# Patient Record
Sex: FEMALE | Race: ASIAN | NOT HISPANIC OR LATINO | Employment: FULL TIME | URBAN - METROPOLITAN AREA
[De-identification: names, ages, dates, MRNs, and addresses within clinical notes are randomized per-mention and may not be internally consistent; named-entity substitution may affect disease eponyms.]

---

## 2021-05-14 ENCOUNTER — OFFICE VISIT (OUTPATIENT)
Dept: OBGYN CLINIC | Facility: CLINIC | Age: 41
End: 2021-05-14
Payer: COMMERCIAL

## 2021-05-14 VITALS
DIASTOLIC BLOOD PRESSURE: 64 MMHG | BODY MASS INDEX: 21.31 KG/M2 | HEIGHT: 64 IN | WEIGHT: 124.8 LBS | SYSTOLIC BLOOD PRESSURE: 90 MMHG

## 2021-05-14 DIAGNOSIS — Z01.419 ENCOUNTER FOR ANNUAL ROUTINE GYNECOLOGICAL EXAMINATION: Primary | ICD-10-CM

## 2021-05-14 DIAGNOSIS — F41.9 ANXIETY: ICD-10-CM

## 2021-05-14 DIAGNOSIS — Z12.31 ENCOUNTER FOR SCREENING MAMMOGRAM FOR MALIGNANT NEOPLASM OF BREAST: ICD-10-CM

## 2021-05-14 DIAGNOSIS — Z12.4 CERVICAL CANCER SCREENING: ICD-10-CM

## 2021-05-14 DIAGNOSIS — Z30.011 ENCOUNTER FOR BCP (BIRTH CONTROL PILLS) INITIAL PRESCRIPTION: ICD-10-CM

## 2021-05-14 PROCEDURE — G0145 SCR C/V CYTO,THINLAYER,RESCR: HCPCS | Performed by: OBSTETRICS & GYNECOLOGY

## 2021-05-14 PROCEDURE — 99386 PREV VISIT NEW AGE 40-64: CPT | Performed by: OBSTETRICS & GYNECOLOGY

## 2021-05-14 PROCEDURE — 87624 HPV HI-RISK TYP POOLED RSLT: CPT | Performed by: OBSTETRICS & GYNECOLOGY

## 2021-05-14 RX ORDER — CLONIDINE HYDROCHLORIDE 0.1 MG/1
0.1 TABLET ORAL
COMMUNITY
Start: 2021-05-07

## 2021-05-14 RX ORDER — NORETHINDRONE ACETATE AND ETHINYL ESTRADIOL 1MG-20(21)
1 KIT ORAL DAILY
Qty: 28 TABLET | Refills: 3 | Status: SHIPPED | OUTPATIENT
Start: 2021-05-14

## 2021-05-14 NOTE — PROGRESS NOTES
Annual Wellness Visit  [address]   [address] [facility], [place]    ASSESSMENT/PLAN: Barrett Royal is a 41 y o   who presents for annual gynecologic exam     Encounter for routine gynecologic examination  - Routine well woman exam completed today  - Cervical Cancer Screening: Current ASCCP Guidelines reviewed  Last Pap: 2018 per pt Next Pap Due: today, routine   - STI screening offered including HIV testing: offered, pt declined  - Contraceptive counseling discussed  Current contraception: condoms  - Breast Cancer Screening: Last Mammogram Not on file, not yet done  - The following were reviewed in today's visit: mammography screening ordered, use and side effects of OCPs, exercise and healthy diet    Additional problems addressed during this visit:  1  Encounter for annual routine gynecological examination    2  Encounter for screening mammogram for malignant neoplasm of breast  -     Mammo screening bilateral w 3d & cad; Future; Expected date: 2022    3  Cervical cancer screening  -     Liquid-based pap, screening    4  Encounter for BCP (birth control pills) initial prescription  Comments:  reviewed BCP could help with anxiety, although for some people they cause anxiety  We can only determine her response through trial and error  Risks, benefits and limitations of hormonal contraction discussed - including increase risk of DVT/PE w/ all estrogen containing contraceptives  No Contraindication to use identified  Reviewed that only condoms protect against STIs  Will give pt 3 months of contraceptive  To start with/after next period (reviewed w/ pt timing)  Return in 3 months to review  Orders:  -     norethindrone-ethinyl estradiol (JUNEL FE 1/20) 1-20 MG-MCG per tablet; Take 1 tablet by mouth daily    5  Anxiety  Assessment & Plan:  Seeing psychiatrist and therapist   Started clonopin 2021      Pt feels it is worse since periods became regular and wonders if hormonal   Discussed that her hormones for ovulation are likely normal because she is having regular periods although she may be sensitive to the changes in these hormones with ovulation and they worsen her anxiety  She states always felt good on OCPs and willing to see if OCPs will help stabilize hormone levels and help anxiety  Next visit: 3 month pill check; 1 year Wellness      CC:  Annual Gynecologic Examination    HPI: Jam Parry is a 41 y o   who presents for annual gynecologic examination  New patient presents for gyn annual   She denies issues with periods, breast or urinary issues  Ayeshaallyssa Narinder does state she has issues with anxiety and she feels this is worsened by hormones  She report h/o PCOS in past, very irregular periods and needed to used clomid to concieve  States when periods were irregular she never had anxiety issues  After the delivery of her daughter 3 years ago she had severe anxiety - so badly required them to relocate to [place] from [place]  She states she also started having regular periods after her daughter was born  Her anxiety had been improving after the move, but she states it worsened suddenly when she got Covid 19 in January  Physically she is recovered from Covid but anxiety is still elevated  She states she follows with a psychiatrist in [place] and a therapist here in [place]  She was recently started on clonopin for the anxiety a couple weeks ago  She states when she was on OCPs in her 25s she always felt well - called it "happy pills"  Last pap 2018, normal paps except early 20s, returned, + HPV, HSV  No mammograms previously  Gyn History  Patient's last menstrual period was 2021 (within days)  Menstrual History  Period Pattern: Regular  Last Pap: Not on file was normal    She  reports being sexually active and has had partner(s) who are Male   She reports using the following method of birth control/protection: Condom Male   Sexual History  Sexually Transmitted Infection History: HSV  Is Patient in a Monogamous Relationship?: Yes    OB History      Past Medical History:  2018: Anxiety      Comment:  started after delivery 3rd daughter; seeing psychiatrist               and therapist  No date: Insomnia  No date: Pre-diabetes      Comment:  PCP checks HgA1c every 6 months     Past Surgical History:  : EYE SURGERY; Right      Comment:  removal of benign tumor  : WISDOM TOOTH EXTRACTION     Family History   Problem Relation Age of Onset    Hashimoto's thyroiditis Sister     Breast cancer Neg Hx     Uterine cancer Neg Hx     Ovarian cancer Neg Hx     Colon cancer Neg Hx         Social History     Tobacco Use    Smoking status: Never Smoker    Smokeless tobacco: Never Used   Substance Use Topics    Alcohol use: Not Currently    Drug use: Never          Current Outpatient Medications:     cloNIDine (CATAPRES) 0 1 mg tablet, Take 0 1 mg by mouth daily at bedtime, Disp: , Rfl:     norethindrone-ethinyl estradiol (JUNEL FE 1/20) 1-20 MG-MCG per tablet, Take 1 tablet by mouth daily, Disp: 28 tablet, Rfl: 3    She is allergic to amoxicillin       ROS negative except as noted in HPI    Objective:  BP 90/64   Ht 5' 3 75" (1 619 m)   Wt 56 6 kg (124 lb 12 8 oz)   LMP 2021 (Within Days)   Breastfeeding No   BMI 21 59 kg/m²      Physical Exam  Constitutional:       Appearance: Normal appearance  Chest:      Breasts:         Right: No mass or tenderness  Left: No mass or tenderness  Abdominal:      Palpations: Abdomen is soft  Tenderness: There is no abdominal tenderness  Genitourinary:     General: Normal vulva  Vagina: No bleeding or lesions  Cervix: Normal       Uterus: Normal  Not tender  Adnexa:         Right: No mass or tenderness  Left: No mass or tenderness  Rectum: No external hemorrhoid  Musculoskeletal: Normal range of motion     Lymphadenopathy: Upper Body:      Right upper body: No axillary adenopathy  Left upper body: No axillary adenopathy  Neurological:      Mental Status: She is alert and oriented to person, place, and time     Psychiatric:         Mood and Affect: Mood normal          Behavior: Behavior normal

## 2021-05-14 NOTE — ASSESSMENT & PLAN NOTE
Seeing psychiatrist and therapist   Started clonopin 5/2021  Pt feels it is worse since periods became regular and wonders if hormonal   Discussed that her hormones for ovulation are likely normal because she is having regular periods although she may be sensitive to the changes in these hormones with ovulation and they worsen her anxiety  She states always felt good on OCPs and willing to see if OCPs will help stabilize hormone levels and help anxiety

## 2021-05-17 LAB
HPV HR 12 DNA CVX QL NAA+PROBE: NEGATIVE
HPV16 DNA CVX QL NAA+PROBE: NEGATIVE
HPV18 DNA CVX QL NAA+PROBE: NEGATIVE

## 2021-05-19 LAB
LAB AP GYN PRIMARY INTERPRETATION: NORMAL
LAB AP LMP: NORMAL
Lab: NORMAL

## 2021-07-27 ENCOUNTER — TELEPHONE (OUTPATIENT)
Dept: OBGYN CLINIC | Facility: CLINIC | Age: 41
End: 2021-07-27

## 2021-07-30 NOTE — TELEPHONE ENCOUNTER
Puja l/m she would like her mammo orders printed in [place] to   She would like c/b to confirm  Spoke with Jeff Correa in [place] who will print out for patient    L/m on patient v/m

## 2023-02-08 ENCOUNTER — TELEPHONE (OUTPATIENT)
Dept: OBGYN CLINIC | Facility: CLINIC | Age: 43
End: 2023-02-08

## 2023-02-08 NOTE — TELEPHONE ENCOUNTER
Pt left a message she has been having left breast tenderness for months and is requesting to have an updated order for her mammogram   Last WA 5/14/2021,   Attempted to contact patient to further address, voice mailbox full, unable to leave a message

## 2023-02-08 NOTE — TELEPHONE ENCOUNTER
Spoke with patient and she reports, tenderness around the left nipple over the past month, more notabl  after a yoga workout  States" feels like I have mastitis," but denies redness, streaking, palpable lump or irregularity of nipple  Breast check appointment recommended to provide appropriate breast imaging orders   Pt transferred to reception to schedule

## 2023-02-24 ENCOUNTER — ANNUAL EXAM (OUTPATIENT)
Dept: OBGYN CLINIC | Facility: CLINIC | Age: 43
End: 2023-02-24

## 2023-02-24 VITALS
SYSTOLIC BLOOD PRESSURE: 102 MMHG | DIASTOLIC BLOOD PRESSURE: 60 MMHG | BODY MASS INDEX: 22.84 KG/M2 | WEIGHT: 133.8 LBS | HEIGHT: 64 IN

## 2023-02-24 DIAGNOSIS — Z01.419 ENCOUNTER FOR ANNUAL ROUTINE GYNECOLOGICAL EXAMINATION: Primary | ICD-10-CM

## 2023-02-24 DIAGNOSIS — Z12.31 ENCOUNTER FOR SCREENING MAMMOGRAM FOR MALIGNANT NEOPLASM OF BREAST: ICD-10-CM

## 2023-02-24 DIAGNOSIS — N64.4 BREAST PAIN, LEFT: ICD-10-CM

## 2023-02-24 RX ORDER — QUETIAPINE FUMARATE 50 MG/1
TABLET, EXTENDED RELEASE ORAL
COMMUNITY
Start: 2023-02-03

## 2023-02-24 NOTE — LETTER
2023     Skip Meth  [address]     Patient: Jomar Gandhi   YOB: 1980   Date of Visit: 2023       Dear Dr Herrera Rank: Thank you for referring Jomar Gandhi to me for evaluation  Below are my notes for this consultation  If you have questions, please do not hesitate to call me  I look forward to following your patient along with you  Sincerely,        Sandeep Bobby MD        CC: No Recipients  Sandeep Bobby MD  2023  2:16 PM  Sign when Signing Visit  Annual Wellness Visit  [address]   [address] [facility], [address], [facility]    ASSESSMENT/PLAN: Jomar Gandhi is a 38 y o   who presents for annual gynecologic exam     Encounter for routine gynecologic examination  - Routine well woman exam completed today  - Cervical Cancer Screening: Current ASCCP Guidelines reviewed  Last Pap: 2021 normal  Next Pap Due: 2 years, routine   - STI screening offered including HIV testing: offered, pt declined  - Contraceptive counseling discussed  Current contraception: condoms,   - Breast Cancer Screening: Last Mammogram 2022 per patient normal  - The following were reviewed in today's visit: mammography screening ordered, menopause, exercise and healthy diet    Additional problems addressed during this visit:  1  Encounter for annual routine gynecological examination    2  Encounter for screening mammogram for malignant neoplasm of breast  Comments:  Pt reports due for screening mammogram in February - provided bilateral diagnostic order given left breast pain  Orders:  -     Mammo diagnostic bilateral w 3d & cad; Future    3  Breast pain, left  Comments:  Intermittent left breast pain over last few months  Dx mammo and u/s order provided  Orders:  -     Mammo diagnostic bilateral w 3d & cad; Future  -     US breast left limited (diagnostic);  Future        Next visit: 1 year Wellness      CC:  Annual Gynecologic Examination    HPI: Carol Belcher is a 38 y o   who presents for annual gynecologic examination  She denies any urinary or pelvic issues at today's visit  Periods monthly since last delivery  Left breast pain off and on last couple months  May be cyclical or after exercise but she is unsure  No lumps or skin changes  Just when she stops worrying because it went away it seems to return again  Gyn History  Patient's last menstrual period was 02/10/2023 (approximate)  Last Pap: 2021 was normal    She  reports being sexually active and has had partner(s) who are male  She reports using the following method of birth control/protection: Condom Male  OB History      Past Medical History:  2018: Anxiety      Comment:  started after delivery 3rd daughter; seeing psychiatrist               and therapist  No date: Insomnia  No date: Pre-diabetes      Comment:  PCP checks HgA1c every 6 months     Past Surgical History:  1983: EYE SURGERY; Right      Comment:  removal of benign tumor  : WISDOM TOOTH EXTRACTION     Family History   Problem Relation Age of Onset   • Hashimoto's thyroiditis Sister    • Breast cancer Neg Hx    • Uterine cancer Neg Hx    • Ovarian cancer Neg Hx    • Colon cancer Neg Hx         Social History     Tobacco Use   • Smoking status: Never   • Smokeless tobacco: Never   Vaping Use   • Vaping Use: Never used   Substance Use Topics   • Alcohol use: Not Currently   • Drug use: Never          Current Outpatient Medications:   •  QUEtiapine (SEROquel XR) 50 mg, TAKE TWO TABLETS BY MOUTH EVERY EVENING, Disp: , Rfl:     She is allergic to amoxicillin       ROS negative except as noted in HPI    Objective:  /60   Ht 5' 3 5" (1 613 m)   Wt 60 7 kg (133 lb 12 8 oz)   LMP 02/10/2023 (Approximate)   Breastfeeding No   BMI 23 33 kg/m²     Physical Exam  Constitutional:       Appearance: Normal appearance  Chest:   Breasts:     Right: Normal  No mass or tenderness  Left: Normal  No mass or tenderness  Abdominal:      Palpations: Abdomen is soft  Tenderness: There is no abdominal tenderness  Genitourinary:     General: Normal vulva  Vagina: No bleeding or lesions  Cervix: Normal       Uterus: Normal  Not tender  Adnexa:         Right: No mass or tenderness  Left: No mass or tenderness  Rectum: No external hemorrhoid  Musculoskeletal:         General: Normal range of motion  Lymphadenopathy:      Upper Body:      Right upper body: No axillary adenopathy  Left upper body: No axillary adenopathy  Neurological:      Mental Status: She is alert and oriented to person, place, and time     Psychiatric:         Mood and Affect: Mood normal          Behavior: Behavior normal

## 2023-02-24 NOTE — PROGRESS NOTES
Annual Wellness Visit  [address]   [address], [address], [facility]    ASSESSMENT/PLAN: Bharat Anguiano is a 38 y o   who presents for annual gynecologic exam     Encounter for routine gynecologic examination  - Routine well woman exam completed today  - Cervical Cancer Screening: Current ASCCP Guidelines reviewed  Last Pap: 2021 normal  Next Pap Due: 2 years, routine   - STI screening offered including HIV testing: offered, pt declined  - Contraceptive counseling discussed  Current contraception: condoms,   - Breast Cancer Screening: Last Mammogram 2022 per patient normal  - The following were reviewed in today's visit: mammography screening ordered, menopause, exercise and healthy diet    Additional problems addressed during this visit:  1  Encounter for annual routine gynecological examination    2  Encounter for screening mammogram for malignant neoplasm of breast  Comments:  Pt reports due for screening mammogram in February - provided bilateral diagnostic order given left breast pain  Orders:  -     Mammo diagnostic bilateral w 3d & cad; Future    3  Breast pain, left  Comments:  Intermittent left breast pain over last few months  Dx mammo and u/s order provided  Orders:  -     Mammo diagnostic bilateral w 3d & cad; Future  -     US breast left limited (diagnostic); Future        Next visit: 1 year Wellness      CC:  Annual Gynecologic Examination    HPI: Bharat Anguiano is a 38 y o   who presents for annual gynecologic examination  She denies any urinary or pelvic issues at today's visit  Periods monthly since last delivery  Left breast pain off and on last couple months  May be cyclical or after exercise but she is unsure  No lumps or skin changes  Just when she stops worrying because it went away it seems to return again  Gyn History  Patient's last menstrual period was 02/10/2023 (approximate)       Last Pap: 2021 was normal    She reports being sexually active and has had partner(s) who are male  She reports using the following method of birth control/protection: Condom Male  OB History      Past Medical History:  2018: Anxiety      Comment:  started after delivery 3rd daughter; seeing psychiatrist               and therapist  No date: Insomnia  No date: Pre-diabetes      Comment:  PCP checks HgA1c every 6 months     Past Surgical History:  : EYE SURGERY; Right      Comment:  removal of benign tumor  : WISDOM TOOTH EXTRACTION     Family History   Problem Relation Age of Onset   • Hashimoto's thyroiditis Sister    • Breast cancer Neg Hx    • Uterine cancer Neg Hx    • Ovarian cancer Neg Hx    • Colon cancer Neg Hx         Social History     Tobacco Use   • Smoking status: Never   • Smokeless tobacco: Never   Vaping Use   • Vaping Use: Never used   Substance Use Topics   • Alcohol use: Not Currently   • Drug use: Never          Current Outpatient Medications:   •  QUEtiapine (SEROquel XR) 50 mg, TAKE TWO TABLETS BY MOUTH EVERY EVENING, Disp: , Rfl:     She is allergic to amoxicillin       ROS negative except as noted in HPI    Objective:  /60   Ht 5' 3 5" (1 613 m)   Wt 60 7 kg (133 lb 12 8 oz)   LMP 02/10/2023 (Approximate)   Breastfeeding No   BMI 23 33 kg/m²      Physical Exam  Constitutional:       Appearance: Normal appearance  Chest:   Breasts:     Right: Normal  No mass or tenderness  Left: Normal  No mass or tenderness  Abdominal:      Palpations: Abdomen is soft  Tenderness: There is no abdominal tenderness  Genitourinary:     General: Normal vulva  Vagina: No bleeding or lesions  Cervix: Normal       Uterus: Normal  Not tender  Adnexa:         Right: No mass or tenderness  Left: No mass or tenderness  Rectum: No external hemorrhoid  Musculoskeletal:         General: Normal range of motion     Lymphadenopathy:      Upper Body:      Right upper body: No axillary adenopathy  Left upper body: No axillary adenopathy  Neurological:      Mental Status: She is alert and oriented to person, place, and time     Psychiatric:         Mood and Affect: Mood normal          Behavior: Behavior normal

## 2023-03-15 DIAGNOSIS — N64.4 BREAST PAIN, LEFT: ICD-10-CM

## 2023-03-15 DIAGNOSIS — Z12.31 ENCOUNTER FOR SCREENING MAMMOGRAM FOR MALIGNANT NEOPLASM OF BREAST: ICD-10-CM

## 2024-03-14 ENCOUNTER — ANNUAL EXAM (OUTPATIENT)
Dept: OBGYN CLINIC | Facility: CLINIC | Age: 44
End: 2024-03-14
Payer: COMMERCIAL

## 2024-03-14 VITALS
HEIGHT: 64 IN | WEIGHT: 139 LBS | SYSTOLIC BLOOD PRESSURE: 120 MMHG | BODY MASS INDEX: 23.73 KG/M2 | DIASTOLIC BLOOD PRESSURE: 84 MMHG

## 2024-03-14 DIAGNOSIS — Z12.11 SCREENING FOR COLON CANCER: ICD-10-CM

## 2024-03-14 DIAGNOSIS — Z01.419 ENCOUNTER FOR ANNUAL ROUTINE GYNECOLOGICAL EXAMINATION: Primary | ICD-10-CM

## 2024-03-14 DIAGNOSIS — Z12.31 ENCOUNTER FOR SCREENING MAMMOGRAM FOR MALIGNANT NEOPLASM OF BREAST: ICD-10-CM

## 2024-03-14 PROCEDURE — 99396 PREV VISIT EST AGE 40-64: CPT | Performed by: OBSTETRICS & GYNECOLOGY

## 2024-03-14 NOTE — PROGRESS NOTES
Annual Wellness Visit  [facility] - [address], [address], [place] [address]    ASSESSMENT/PLAN: Puja William is a 43 y.o.  who presents for annual gynecologic exam.    Encounter for routine gynecologic examination  - Routine well woman exam completed today.  - Cervical Cancer Screening: Current ASCCP Guidelines reviewed. Last Pap: 2021 normal. Next Pap Due: 1 year, routine.  - STI screening offered including HIV testing: offered, pt declined  - Contraceptive counseling discussed.  Current contraception: condoms,   - Breast Cancer Screening: Last Mammogram 2023, normal  - Colorectal cancer screening: not done - referral provided.  - The following were reviewed in today's visit: mammography screening ordered, exercise, healthy diet, and colonoscopy discussed and referral placed    Additional problems addressed during this visit:  1. Encounter for annual routine gynecological examination    2. Encounter for screening mammogram for malignant neoplasm of breast  -     Mammo screening bilateral w 3d & cad; Future    3. Screening for colon cancer  Comments:  Patient reports sister with colon polyp.  recom colonoscopy.  Referral provided.  Orders:  -     Ambulatory Referral to Gastroenterology; Future        Next visit: 1 year Wellness      CC:  Annual Gynecologic Examination    HPI: Puja William is a 43 y.o.  who presents for annual gynecologic examination.  She denies any breast, urinary or pelvic issues at today's visit.    Periods monthly, regular.  No issues.  Sexually active, no new partners, condoms.        Gyn History  Patient's last menstrual period was 2024.     Last Pap: 2021 was normal    She  reports being sexually active and has had partner(s) who are male. She reports using the following method of birth control/protection: Condom Male.       OB History      Past Medical History:  2018: Anxiety      Comment:  started after delivery 3rd daughter;  "seeing psychiatrist               and therapist  No date: Insomnia  No date: Pre-diabetes      Comment:  PCP checks HgA1c every 6 months     Past Surgical History:  1983: EYE SURGERY; Right      Comment:  removal of benign tumor  1995: WISDOM TOOTH EXTRACTION     Family History   Problem Relation Age of Onset    Colon polyps Sister         twin    Hashimoto's thyroiditis Sister     Breast cancer Neg Hx     Uterine cancer Neg Hx     Ovarian cancer Neg Hx     Colon cancer Neg Hx         Social History     Tobacco Use    Smoking status: Never    Smokeless tobacco: Never   Vaping Use    Vaping status: Never Used   Substance Use Topics    Alcohol use: Not Currently    Drug use: Never          Current Outpatient Medications:     QUEtiapine (SEROquel XR) 50 mg, Take 50 mg by mouth once, Disp: , Rfl:     She is allergic to amoxicillin..    ROS negative except as noted in HPI    Objective:  /84 (BP Location: Left arm, Patient Position: Sitting, Cuff Size: Standard)   Ht 5' 3.5\" (1.613 m)   Wt 63 kg (139 lb)   LMP 02/25/2024   BMI 24.24 kg/m²      Physical Exam  Constitutional:       Appearance: Normal appearance.   Chest:   Breasts:     Right: Normal. No mass or tenderness.      Left: Normal. No mass or tenderness.   Abdominal:      Palpations: Abdomen is soft.      Tenderness: There is no abdominal tenderness.   Genitourinary:     General: Normal vulva.      Vagina: No bleeding or lesions.      Cervix: Normal.      Uterus: Normal. Not tender.       Adnexa:         Right: No mass or tenderness.          Left: No mass or tenderness.        Rectum: No external hemorrhoid.   Musculoskeletal:         General: Normal range of motion.   Lymphadenopathy:      Upper Body:      Right upper body: No axillary adenopathy.      Left upper body: No axillary adenopathy.   Neurological:      Mental Status: She is alert and oriented to person, place, and time.   Psychiatric:         Mood and Affect: Mood normal.         Behavior: " Behavior normal.

## 2024-03-14 NOTE — LETTER
2024     Iveth Mills  [address] [address]    Patient: Puja William   YOB: 1980   Date of Visit: 3/14/2024       Dear Dr. Mills:    Thank you for referring Puja William to me for evaluation. Below are my notes for this consultation.    If you have questions, please do not hesitate to call me. I look forward to following your patient along with you.         Sincerely,        Savanah Crenshaw MD        CC: No Recipients    Savanah Crenshaw MD  3/14/2024  3:02 PM  Sign when Signing Visit  Annual Wellness Visit  [facility] - [address], [address], [place] [address]    ASSESSMENT/PLAN: Puja William is a 43 y.o.  who presents for annual gynecologic exam.    Encounter for routine gynecologic examination  - Routine well woman exam completed today.  - Cervical Cancer Screening: Current ASCCP Guidelines reviewed. Last Pap: 2021 normal. Next Pap Due: 1 year, routine.  - STI screening offered including HIV testing: offered, pt declined  - Contraceptive counseling discussed.  Current contraception: condoms,   - Breast Cancer Screening: Last Mammogram 2023, normal  - Colorectal cancer screening: not done - referral provided.  - The following were reviewed in today's visit: mammography screening ordered, exercise, healthy diet, and colonoscopy discussed and referral placed    Additional problems addressed during this visit:  1. Encounter for annual routine gynecological examination    2. Encounter for screening mammogram for malignant neoplasm of breast  -     Mammo screening bilateral w 3d & cad; Future    3. Screening for colon cancer  Comments:  Patient reports sister with colon polyp.  recom colonoscopy.  Referral provided.  Orders:  -     Ambulatory Referral to Gastroenterology; Future        Next visit: 1 year Wellness      CC:  Annual Gynecologic Examination    HPI: Puja William is a 43 y.o.  who presents for annual gynecologic  "examination.  She denies any breast, urinary or pelvic issues at today's visit.    Periods monthly, regular.  No issues.  Sexually active, no new partners, condoms.        Gyn History  Patient's last menstrual period was 2024.     Last Pap: 2021 was normal    She  reports being sexually active and has had partner(s) who are male. She reports using the following method of birth control/protection: Condom Male.       OB History      Past Medical History:  2018: Anxiety      Comment:  started after delivery 3rd daughter; seeing psychiatrist               and therapist  No date: Insomnia  No date: Pre-diabetes      Comment:  PCP checks HgA1c every 6 months     Past Surgical History:  : EYE SURGERY; Right      Comment:  removal of benign tumor  : WISDOM TOOTH EXTRACTION     Family History   Problem Relation Age of Onset   • Colon polyps Sister         twin   • Hashimoto's thyroiditis Sister    • Breast cancer Neg Hx    • Uterine cancer Neg Hx    • Ovarian cancer Neg Hx    • Colon cancer Neg Hx         Social History     Tobacco Use   • Smoking status: Never   • Smokeless tobacco: Never   Vaping Use   • Vaping status: Never Used   Substance Use Topics   • Alcohol use: Not Currently   • Drug use: Never          Current Outpatient Medications:   •  QUEtiapine (SEROquel XR) 50 mg, Take 50 mg by mouth once, Disp: , Rfl:     She is allergic to amoxicillin..    ROS negative except as noted in HPI    Objective:  /84 (BP Location: Left arm, Patient Position: Sitting, Cuff Size: Standard)   Ht 5' 3.5\" (1.613 m)   Wt 63 kg (139 lb)   LMP 2024   BMI 24.24 kg/m²      Physical Exam  Constitutional:       Appearance: Normal appearance.   Chest:   Breasts:     Right: Normal. No mass or tenderness.      Left: Normal. No mass or tenderness.   Abdominal:      Palpations: Abdomen is soft.      Tenderness: There is no abdominal tenderness.   Genitourinary:     General: Normal vulva.      Vagina: No " bleeding or lesions.      Cervix: Normal.      Uterus: Normal. Not tender.       Adnexa:         Right: No mass or tenderness.          Left: No mass or tenderness.        Rectum: No external hemorrhoid.   Musculoskeletal:         General: Normal range of motion.   Lymphadenopathy:      Upper Body:      Right upper body: No axillary adenopathy.      Left upper body: No axillary adenopathy.   Neurological:      Mental Status: She is alert and oriented to person, place, and time.   Psychiatric:         Mood and Affect: Mood normal.         Behavior: Behavior normal.

## 2025-04-03 ENCOUNTER — ANNUAL EXAM (OUTPATIENT)
Dept: OBGYN CLINIC | Facility: CLINIC | Age: 45
End: 2025-04-03
Payer: COMMERCIAL

## 2025-04-03 VITALS
BODY MASS INDEX: 20.32 KG/M2 | HEIGHT: 64 IN | WEIGHT: 119 LBS | DIASTOLIC BLOOD PRESSURE: 70 MMHG | SYSTOLIC BLOOD PRESSURE: 110 MMHG

## 2025-04-03 DIAGNOSIS — Z01.419 ENCOUNTER FOR ANNUAL ROUTINE GYNECOLOGICAL EXAMINATION: Primary | ICD-10-CM

## 2025-04-03 DIAGNOSIS — F41.9 ANXIETY: ICD-10-CM

## 2025-04-03 DIAGNOSIS — Z12.4 CERVICAL CANCER SCREENING: ICD-10-CM

## 2025-04-03 DIAGNOSIS — Z12.31 ENCOUNTER FOR SCREENING MAMMOGRAM FOR MALIGNANT NEOPLASM OF BREAST: ICD-10-CM

## 2025-04-03 PROCEDURE — 99396 PREV VISIT EST AGE 40-64: CPT | Performed by: OBSTETRICS & GYNECOLOGY

## 2025-04-03 RX ORDER — DIPHENOXYLATE HYDROCHLORIDE AND ATROPINE SULFATE 2.5; .025 MG/1; MG/1
1 TABLET ORAL DAILY
COMMUNITY

## 2025-04-03 RX ORDER — FERROUS GLUCONATE 324(38)MG
324 TABLET ORAL
COMMUNITY

## 2025-04-03 RX ORDER — MAGNESIUM L-LACTATE 84 MG
84 TABLET, EXTENDED RELEASE ORAL DAILY
COMMUNITY

## 2025-04-03 RX ORDER — OMEGA-3S/DHA/EPA/FISH OIL/D3 300MG-1000
400 CAPSULE ORAL DAILY
COMMUNITY

## 2025-04-03 NOTE — LETTER
2025     Iveth Mills  [address] [address]    Patient: Puja William   YOB: 1980   Date of Visit: 4/3/2025       Dear Dr. Iveth Mills:    Thank you for referring Puja William to me for evaluation. Below are my notes for this consultation.    If you have questions, please do not hesitate to call me. I look forward to following your patient along with you.         Sincerely,        Savanah Crenshaw MD        CC: No Recipients    Savanah Crenshaw MD  2025  6:08 PM  Sign when Signing Visit  Annual Wellness Visit  [facility] - [address], [address], [place] [address]    ASSESSMENT/PLAN: Puja William is a 44 y.o.  who presents for annual gynecologic exam.    Assessment & Plan  Encounter for annual routine gynecological examination  - Routine well woman exam completed today.  - Cervical Cancer Screening: Current ASCCP Guidelines reviewed. Last Pap: 2021 normal. Past abnormal pap: h/o + HPV.  Next Pap Due: today.  - Contraceptive counseling discussed.  Current contraception: condoms,   - Breast Cancer Screening: Last Mammogram 2024 normal  - Colorectal cancer screening last  per pt, next due .  - The following were reviewed in today's visit: mammography screening ordered, family planning choices, exercise, and healthy diet       Encounter for screening mammogram for malignant neoplasm of breast    Orders:  •  Mammo screening bilateral w 3d and cad; Future    Cervical cancer screening    Orders:  •  Thinprep Tis Pap and HPV mRNA E6/E7 Reflex HPV 16,18/45      Next visit: 1 year Wellness      CC:  Annual Gynecologic Examination    HPI: Puja William is a 44 y.o.  who presents for annual gynecologic examination.  She denies any breast, urinary or pelvic issues at today's visit.    Periods monthly, regular.  No issues.  Sexually active, no new partners, condoms.  Colonoscopy normal , every 5 years due to sister history.      Patient reports recently dx with OCD and she is struggling to cope with diagnosis.  She reports diagnosis of anxiety since delivery 7 years ago.  She has been on medication since then, successfully on Seroquel.  Recently she tried tapering down the Seroquel and symptoms worsened.  Currently seeing psychiatrist and therapist.  Feel a lot of guilt about passing these traits on to her children, being a good mom while dealing with this and society/family/cultural stigma of having a mental health disorder.        Gyn History  Patient's last menstrual period was 2025 (approximate).     Last Pap: 2021 was normal    She  reports being sexually active and has had partner(s) who are male. She reports using the following method of birth control/protection: Condom Male.       OB History      Past Medical History:  2018: Anxiety      Comment:  started after delivery 3rd daughter; seeing psychiatrist               and therapist  No date: Insomnia  No date: Pre-diabetes      Comment:  PCP checks HgA1c every 6 months     Past Surgical History:  : EYE SURGERY; Right      Comment:  removal of benign tumor  : WISDOM TOOTH EXTRACTION     Family History   Problem Relation Age of Onset   • Colon polyps Sister         twin   • Hashimoto's thyroiditis Sister    • Breast cancer Neg Hx    • Uterine cancer Neg Hx    • Ovarian cancer Neg Hx    • Colon cancer Neg Hx         Social History     Tobacco Use   • Smoking status: Never   • Smokeless tobacco: Never   Vaping Use   • Vaping status: Never Used   Substance Use Topics   • Alcohol use: Not Currently   • Drug use: Never          Current Outpatient Medications:   •  cholecalciferol (VITAMIN D3) 400 units tablet, Take 400 Units by mouth daily, Disp: , Rfl:   •  ferrous gluconate (FERGON) 324 mg tablet, Take 324 mg by mouth daily with breakfast, Disp: , Rfl:   •  magnesium (MAGTAB) 84 MG (7MEQ) TBCR, Take 84 mg by mouth daily, Disp: , Rfl:   •  multivitamin (THERAGRAN)  "TABS, Take 1 tablet by mouth daily, Disp: , Rfl:   •  N-ACETYL CYSTEINE PO, Take by mouth, Disp: , Rfl:   •  Omega-3 Fatty Acids (Fish Oil) 300 MG CAPS, Take by mouth, Disp: , Rfl:   •  QUEtiapine (SEROquel XR) 50 mg, Take 50 mg by mouth once, Disp: , Rfl:     She is allergic to amoxicillin..    ROS negative except as noted in HPI    Objective:  /70 (BP Location: Left arm, Patient Position: Sitting, Cuff Size: Standard)   Ht 5' 3.5\" (1.613 m)   Wt 54 kg (119 lb)   LMP 03/24/2025 (Approximate)   BMI 20.75 kg/m²      Physical Exam  Constitutional:       Appearance: Normal appearance.   Chest:   Breasts:     Right: Normal. No mass or tenderness.      Left: Normal. No mass or tenderness.   Abdominal:      Palpations: Abdomen is soft.      Tenderness: There is no abdominal tenderness.   Genitourinary:     General: Normal vulva.      Vagina: No bleeding or lesions.      Cervix: Normal.      Uterus: Normal. Not tender.       Adnexa:         Right: No mass or tenderness.          Left: No mass or tenderness.        Rectum: No external hemorrhoid.   Musculoskeletal:         General: Normal range of motion.   Lymphadenopathy:      Upper Body:      Right upper body: No axillary adenopathy.      Left upper body: No axillary adenopathy.   Neurological:      Mental Status: She is alert and oriented to person, place, and time.   Psychiatric:         Mood and Affect: Mood normal.         Behavior: Behavior normal.           "

## 2025-04-03 NOTE — PROGRESS NOTES
Annual Wellness Visit  [facility] - [address], [address], [place] [address]    ASSESSMENT/PLAN: Puja William is a 44 y.o.  who presents for annual gynecologic exam.    Assessment & Plan  Encounter for annual routine gynecological examination  - Routine well woman exam completed today.  - Cervical Cancer Screening: Current ASCCP Guidelines reviewed. Last Pap: 2021 normal. Past abnormal pap: h/o + HPV.  Next Pap Due: today.  - Contraceptive counseling discussed.  Current contraception: condoms,   - Breast Cancer Screening: Last Mammogram 2024 normal  - Colorectal cancer screening last  per pt, next due .  - The following were reviewed in today's visit: mammography screening ordered, family planning choices, exercise, and healthy diet       Encounter for screening mammogram for malignant neoplasm of breast    Orders:    Mammo screening bilateral w 3d and cad; Future    Cervical cancer screening    Orders:    Thinprep Tis Pap and HPV mRNA E6/E7 Reflex HPV 16,18/45    Anxiety  H/o anxiety since daughter born and now recent dx OCD.  She is working with psychiatrist and therapist, but struggling with feeling a lot of guilt about passing these traits on to her children, being a good mom while dealing with this and society/family/cultural stigma of having a mental health disorder.  Discussed mental health disorders are common and many people take medication.  Reassurance given and discussed considering Meditation Based Stress Reduction Training.           Next visit: 1 year Wellness      CC:  Annual Gynecologic Examination    HPI: Puja William is a 44 y.o.  who presents for annual gynecologic examination.  She denies any breast, urinary or pelvic issues at today's visit.    Periods monthly, regular.  No issues.  Sexually active, no new partners, condoms.  Colonoscopy normal , every 5 years due to sister history.     Patient reports recently dx with OCD and she is  struggling to cope with diagnosis.  She reports diagnosis of anxiety since delivery 7 years ago.  She has been on medication since then, successfully on Seroquel.  Recently she tried tapering down the Seroquel and symptoms worsened.  Currently seeing psychiatrist and therapist.  Feel a lot of guilt about passing these traits on to her children, being a good mom while dealing with this and society/family/cultural stigma of having a mental health disorder.        Gyn History  Patient's last menstrual period was 2025 (approximate).     Last Pap: 2021 was normal    She  reports being sexually active and has had partner(s) who are male. She reports using the following method of birth control/protection: Condom Male.       OB History      Past Medical History:  2018: Anxiety      Comment:  started after delivery 3rd daughter; seeing psychiatrist               and therapist  No date: Insomnia  : OCD (obsessive compulsive disorder)  No date: Pre-diabetes      Comment:  PCP checks HgA1c every 6 months     Past Surgical History:  : EYE SURGERY; Right      Comment:  removal of benign tumor  : WISDOM TOOTH EXTRACTION     Family History   Problem Relation Age of Onset    Colon polyps Sister         twin    Hashimoto's thyroiditis Sister     Breast cancer Neg Hx     Uterine cancer Neg Hx     Ovarian cancer Neg Hx     Colon cancer Neg Hx         Social History     Tobacco Use    Smoking status: Never    Smokeless tobacco: Never   Vaping Use    Vaping status: Never Used   Substance Use Topics    Alcohol use: Not Currently    Drug use: Never          Current Outpatient Medications:     cholecalciferol (VITAMIN D3) 400 units tablet, Take 400 Units by mouth daily, Disp: , Rfl:     ferrous gluconate (FERGON) 324 mg tablet, Take 324 mg by mouth daily with breakfast, Disp: , Rfl:     magnesium (MAGTAB) 84 MG (7MEQ) TBCR, Take 84 mg by mouth daily, Disp: , Rfl:     multivitamin (THERAGRAN) TABS, Take 1 tablet  "by mouth daily, Disp: , Rfl:     N-ACETYL CYSTEINE PO, Take by mouth, Disp: , Rfl:     Omega-3 Fatty Acids (Fish Oil) 300 MG CAPS, Take by mouth, Disp: , Rfl:     QUEtiapine (SEROquel XR) 50 mg, Take 50 mg by mouth once, Disp: , Rfl:     She is allergic to amoxicillin..    ROS negative except as noted in HPI    Objective:  /70 (BP Location: Left arm, Patient Position: Sitting, Cuff Size: Standard)   Ht 5' 3.5\" (1.613 m)   Wt 54 kg (119 lb)   LMP 03/24/2025 (Approximate)   BMI 20.75 kg/m²      Physical Exam  Constitutional:       Appearance: Normal appearance.   Chest:   Breasts:     Right: Normal. No mass or tenderness.      Left: Normal. No mass or tenderness.   Abdominal:      Palpations: Abdomen is soft.      Tenderness: There is no abdominal tenderness.   Genitourinary:     General: Normal vulva.      Vagina: No bleeding or lesions.      Cervix: Normal.      Uterus: Normal. Not tender.       Adnexa:         Right: No mass or tenderness.          Left: No mass or tenderness.        Rectum: No external hemorrhoid.   Musculoskeletal:         General: Normal range of motion.   Lymphadenopathy:      Upper Body:      Right upper body: No axillary adenopathy.      Left upper body: No axillary adenopathy.   Neurological:      Mental Status: She is alert and oriented to person, place, and time.   Psychiatric:         Mood and Affect: Mood normal.         Behavior: Behavior normal.         "

## 2025-04-05 LAB
CLINICAL INFO: NORMAL
CYTO CVX: NORMAL
CYTOLOGY CMNT CVX/VAG CYTO-IMP: NORMAL
DATE PREVIOUS BX: NORMAL
HPV E6+E7 MRNA CVX QL NAA+PROBE: NOT DETECTED
LMP START DATE: NORMAL
SL AMB PREV. PAP:: NORMAL
SPECIMEN SOURCE CVX/VAG CYTO: NORMAL

## 2025-04-06 ENCOUNTER — RESULTS FOLLOW-UP (OUTPATIENT)
Dept: OBGYN CLINIC | Facility: CLINIC | Age: 45
End: 2025-04-06

## 2025-04-06 NOTE — ASSESSMENT & PLAN NOTE
H/o anxiety since daughter born and now recent dx OCD.  She is working with psychiatrist and therapist, but struggling with feeling a lot of guilt about passing these traits on to her children, being a good mom while dealing with this and society/family/cultural stigma of having a mental health disorder.  Discussed mental health disorders are common and many people take medication.  Reassurance given and discussed considering Meditation Based Stress Reduction Training.